# Patient Record
Sex: FEMALE | Race: WHITE | NOT HISPANIC OR LATINO | URBAN - METROPOLITAN AREA
[De-identification: names, ages, dates, MRNs, and addresses within clinical notes are randomized per-mention and may not be internally consistent; named-entity substitution may affect disease eponyms.]

---

## 2022-09-19 ENCOUNTER — OFFICE VISIT (OUTPATIENT)
Dept: URBAN - METROPOLITAN AREA CLINIC 35 | Facility: CLINIC | Age: 62
End: 2022-09-19

## 2022-09-19 NOTE — HPI-SURVEILLANCE COLONOSCOPY
62 year old female patient presents today for a surveillance colonoscopy.   Patient has a history of colon polyps.   She Admits/Denies a family history of colon cancer/polyps, esophageal/gastric cancers or diseases.  The last Colonoscopy was performed by Dr. France on 04/12/2016 with findings of a tubular adenoma in the ascending colon  and multiple hyperplastic polyps in the rectum.   Patient currently admits -- bowel movements per --, with/without strain.   Stools are --.  Admits/Denies any blood,  mucus, or melena in stools.    She admits/denies any pruritus ani or rectal pain.

## 2022-10-05 ENCOUNTER — DASHBOARD ENCOUNTERS (OUTPATIENT)
Age: 62
End: 2022-10-05

## 2022-10-05 ENCOUNTER — OFFICE VISIT (OUTPATIENT)
Dept: URBAN - METROPOLITAN AREA CLINIC 35 | Facility: CLINIC | Age: 62
End: 2022-10-05
Payer: COMMERCIAL

## 2022-10-05 VITALS
HEART RATE: 78 BPM | OXYGEN SATURATION: 98 % | BODY MASS INDEX: 24.63 KG/M2 | DIASTOLIC BLOOD PRESSURE: 82 MMHG | WEIGHT: 139 LBS | SYSTOLIC BLOOD PRESSURE: 120 MMHG | HEIGHT: 63 IN

## 2022-10-05 DIAGNOSIS — K59.1 FUNCTIONAL DIARRHEA: ICD-10-CM

## 2022-10-05 DIAGNOSIS — Z86.010 PERSONAL HISTORY OF COLONIC POLYPS: ICD-10-CM

## 2022-10-05 PROCEDURE — 99204 OFFICE O/P NEW MOD 45 MIN: CPT | Performed by: PHYSICIAN ASSISTANT

## 2022-10-05 RX ORDER — PSEUDOEPHEDRINE HCL 30 MG
1 TABLET AS NEEDED TABLET ORAL
Status: DISCONTINUED | COMMUNITY

## 2022-10-05 RX ORDER — FLUTICASONE PROPIONATE 50 UG/1
1 SPRAY IN EACH NOSTRIL SPRAY, METERED NASAL ONCE A DAY
Status: ACTIVE | COMMUNITY

## 2022-10-05 RX ORDER — SODIUM PICOSULFATE, MAGNESIUM OXIDE, AND ANHYDROUS CITRIC ACID 10; 3.5; 12 MG/160ML; G/160ML; G/160ML
AS DIRECTED ML LIQUID ORAL
Qty: 1 | Refills: 0 | OUTPATIENT
Start: 2022-10-05 | End: 2022-10-07

## 2022-10-05 RX ORDER — SODIUM PICOSULFATE, MAGNESIUM OXIDE, AND ANHYDROUS CITRIC ACID 10; 3.5; 12 MG/16.1G; G/16.1G; G/16.1G
AS DIRECTED POWDER, METERED ORAL 1
Qty: 1 | Refills: 0 | Status: ON HOLD | COMMUNITY
Start: 2016-03-17

## 2022-10-05 NOTE — HPI-DIARRHEA
Patient admits to having diarrhea if she eats too many chips or Mexican food.  She attributes it to her diet, and also if she drinks wine she will have looser stools.

## 2022-10-05 NOTE — HPI-SURVEILLANCE COLONOSCOPY
62 year old female patient presents today for a surveillance colonoscopy. Patient has a history of colon polyps.   She admits a family history of colon cancer with paternal cousin, and denies a family history of esophageal/gastric cancer. Her last colonoscopy was performed by Dr. France on 04/12/2016 with findings of a tubular adenoma in the ascending colon and multiple hyperplastic polyps in the rectum. Patient currently admits 1-2 bowel movements per day, without strain. Stools alternate between formed and loose. She admits to some fecal incontinence (conscious). Denies any blood, mucus, or melena in stools. She denies any pruritus ani or rectal pain.

## 2022-10-06 PROBLEM — 428283002: Status: ACTIVE | Noted: 2022-10-05

## 2022-10-10 ENCOUNTER — TELEPHONE ENCOUNTER (OUTPATIENT)
Dept: URBAN - METROPOLITAN AREA CLINIC 36 | Facility: CLINIC | Age: 62
End: 2022-10-10

## 2022-10-10 RX ORDER — SODIUM PICOSULFATE, MAGNESIUM OXIDE, AND ANHYDROUS CITRIC ACID 10; 3.5; 12 MG/16.1G; G/16.1G; G/16.1G
AS DIRECTED POWDER, METERED ORAL 1
Qty: 1 | Refills: 0 | Status: ON HOLD | COMMUNITY
Start: 2016-03-17

## 2022-10-10 RX ORDER — FLUTICASONE PROPIONATE 50 UG/1
1 SPRAY IN EACH NOSTRIL SPRAY, METERED NASAL ONCE A DAY
Status: ACTIVE | COMMUNITY

## 2022-10-10 RX ORDER — POLYETHYLENE GLYCOL 3350, SODIUM SULFATE ANHYDROUS, SODIUM BICARBONATE, SODIUM CHLORIDE, POTASSIUM CHLORIDE 236; 22.74; 6.74; 5.86; 2.97 G/4L; G/4L; G/4L; G/4L; G/4L
AS DIRECTED POWDER, FOR SOLUTION ORAL AS DIRECTED
Qty: 1 | Refills: 0 | OUTPATIENT
Start: 2022-10-10 | End: 2022-10-12

## 2022-10-13 ENCOUNTER — OFFICE VISIT (OUTPATIENT)
Dept: URBAN - METROPOLITAN AREA SURGERY CENTER 8 | Facility: SURGERY CENTER | Age: 62
End: 2022-10-13
Payer: COMMERCIAL

## 2022-10-13 DIAGNOSIS — Z12.11 COLON CANCER SCREENING: ICD-10-CM

## 2022-10-13 DIAGNOSIS — Z86.010 ADENOMAS PERSONAL HISTORY OF COLONIC POLYPS: ICD-10-CM

## 2022-10-13 PROCEDURE — G0105 COLORECTAL SCRN; HI RISK IND: HCPCS | Performed by: INTERNAL MEDICINE

## 2022-10-13 PROCEDURE — G8907 PT DOC NO EVENTS ON DISCHARG: HCPCS | Performed by: INTERNAL MEDICINE

## 2022-11-03 ENCOUNTER — OFFICE VISIT (OUTPATIENT)
Dept: URBAN - METROPOLITAN AREA CLINIC 35 | Facility: CLINIC | Age: 62
End: 2022-11-03

## 2022-11-03 ENCOUNTER — TELEPHONE ENCOUNTER (OUTPATIENT)
Dept: URBAN - METROPOLITAN AREA CLINIC 35 | Facility: CLINIC | Age: 62
End: 2022-11-03

## 2022-11-03 RX ORDER — SODIUM PICOSULFATE, MAGNESIUM OXIDE, AND ANHYDROUS CITRIC ACID 10; 3.5; 12 MG/16.1G; G/16.1G; G/16.1G
AS DIRECTED POWDER, METERED ORAL 1
Qty: 1 | Refills: 0 | Status: ON HOLD | COMMUNITY
Start: 2016-03-17

## 2022-11-03 RX ORDER — FLUTICASONE PROPIONATE 50 UG/1
1 SPRAY IN EACH NOSTRIL SPRAY, METERED NASAL ONCE A DAY
Status: ACTIVE | COMMUNITY

## 2022-11-03 NOTE — HPI-COLONOSCOPY FOLLOWUP
62 year old female patient presents today for a follow up from her colonoscopy. She admits/denies any complications after her procedure. She currently reports -- bowel movements per --, with/without strain. Her stools are -- and --. She admits/denies any mucus, melena or blood in stools. Admits/Denies any pruritus ani or rectal pain.   Colonoscopy report shows: - Non-thrombosed external hemorrhoids found on perianal exam. - Non-bleeding internal hemorrhoids. - Diverticulosis in the sigmoid colon, in the descending colon, at the splenic flexure and in the distal transverse colon. - No specimens collected